# Patient Record
Sex: FEMALE | Race: WHITE | Employment: OTHER | URBAN - METROPOLITAN AREA
[De-identification: names, ages, dates, MRNs, and addresses within clinical notes are randomized per-mention and may not be internally consistent; named-entity substitution may affect disease eponyms.]

---

## 2021-03-24 ENCOUNTER — OFFICE VISIT (OUTPATIENT)
Dept: DERMATOLOGY | Facility: CLINIC | Age: 71
End: 2021-03-24
Payer: MEDICARE

## 2021-03-24 ENCOUNTER — TELEPHONE (OUTPATIENT)
Dept: DERMATOLOGY | Facility: CLINIC | Age: 71
End: 2021-03-24

## 2021-03-24 VITALS — DIASTOLIC BLOOD PRESSURE: 76 MMHG | HEART RATE: 71 BPM | OXYGEN SATURATION: 97 % | SYSTOLIC BLOOD PRESSURE: 135 MMHG

## 2021-03-24 DIAGNOSIS — D23.9 DERMAL NEVUS: ICD-10-CM

## 2021-03-24 DIAGNOSIS — L71.0 PERIORAL DERMATITIS: ICD-10-CM

## 2021-03-24 DIAGNOSIS — L82.0 INFLAMED SEBORRHEIC KERATOSIS: ICD-10-CM

## 2021-03-24 DIAGNOSIS — D18.01 ANGIOMA OF SKIN: ICD-10-CM

## 2021-03-24 DIAGNOSIS — L81.4 LENTIGO: ICD-10-CM

## 2021-03-24 DIAGNOSIS — L82.1 SEBORRHEIC KERATOSIS: ICD-10-CM

## 2021-03-24 DIAGNOSIS — L82.1 SEBORRHEIC KERATOSIS: Primary | ICD-10-CM

## 2021-03-24 PROCEDURE — 17110 DESTRUCTION B9 LES UP TO 14: CPT | Performed by: DERMATOLOGY

## 2021-03-24 PROCEDURE — 99203 OFFICE O/P NEW LOW 30 MIN: CPT | Mod: 25 | Performed by: DERMATOLOGY

## 2021-03-24 RX ORDER — ROSUVASTATIN CALCIUM 10 MG/1
TABLET, COATED ORAL
COMMUNITY
Start: 2020-08-03

## 2021-03-24 RX ORDER — DOXYCYCLINE 100 MG/1
100 CAPSULE ORAL 2 TIMES DAILY
Qty: 60 CAPSULE | Refills: 3 | Status: SHIPPED | OUTPATIENT
Start: 2021-03-24

## 2021-03-24 RX ORDER — MILK THISTLE 500 MG
1 CAPSULE ORAL
COMMUNITY

## 2021-03-24 RX ORDER — ALPRAZOLAM 0.5 MG
0.5 TABLET ORAL
COMMUNITY
Start: 2020-11-09

## 2021-03-24 RX ORDER — PIMECROLIMUS 10 MG/G
CREAM TOPICAL 2 TIMES DAILY
Qty: 60 G | Refills: 3 | Status: SHIPPED | OUTPATIENT
Start: 2021-03-24 | End: 2021-04-05

## 2021-03-24 RX ORDER — MOXIFLOXACIN 5 MG/ML
1 SOLUTION/ DROPS OPHTHALMIC
COMMUNITY

## 2021-03-24 RX ORDER — PROPRANOLOL HCL 60 MG
60 CAPSULE, EXTENDED RELEASE 24HR ORAL
COMMUNITY

## 2021-03-24 RX ORDER — ALENDRONATE SODIUM 70 MG/1
70 TABLET ORAL
COMMUNITY
Start: 2021-01-21

## 2021-03-24 RX ORDER — SUMATRIPTAN 50 MG/1
50 TABLET, FILM COATED ORAL
COMMUNITY

## 2021-03-24 RX ORDER — MULTIVITAMIN,THERAPEUTIC
1 TABLET ORAL
COMMUNITY

## 2021-03-24 RX ORDER — LISDEXAMFETAMINE DIMESYLATE 20 MG/1
20 CAPSULE ORAL
COMMUNITY
Start: 2019-09-01

## 2021-03-24 RX ORDER — ZOLPIDEM TARTRATE 5 MG/1
TABLET ORAL
COMMUNITY

## 2021-03-24 RX ORDER — LORAZEPAM 0.5 MG/1
0.5 TABLET ORAL
COMMUNITY

## 2021-03-24 RX ORDER — HYDROCHLOROTHIAZIDE 25 MG/1
25 TABLET ORAL
COMMUNITY
Start: 2020-12-09

## 2021-03-24 NOTE — LETTER
3/24/2021         RE: Tabatha Sharma  [address] [facility] [address]        Dear Colleague,    Thank you for referring your patient, Tabatha Sharma, to the [facility]. Please see a copy of my visit note below.    Tabatha Sharma , a 70 year old year old female patient, I was asked to see by Dr. Talley for spots on trunk and rash on face. Patient states this has been present for a while.  Patient reports the following symptoms:  Pimples on chin .  Patient reports the following previous treatments none.  Patient reports the following modifying factors none.  Associated symptoms: itching spots on trunk. .  Patient has no other skin complaints today.  Remainder of the HPI, Meds, PMH, Allergies, FH, and SH was reviewed in chart.    History reviewed. No pertinent past medical history.    History reviewed. No pertinent surgical history.     History reviewed. No pertinent family history.    Social History     Socioeconomic History     Marital status:      Spouse name: Not on file     Number of children: Not on file     Years of education: Not on file     Highest education level: Not on file   Occupational History     Not on file   Social Needs     Financial resource strain: Not on file     Food insecurity     Worry: Not on file     Inability: Not on file     Transportation needs     Medical: Not on file     Non-medical: Not on file   Tobacco Use     Smoking status: Never Smoker     Smokeless tobacco: Never Used   Substance and Sexual Activity     Alcohol use: Not on file     Drug use: Not on file     Sexual activity: Not on file   Lifestyle     Physical activity     Days per week: Not on file     Minutes per session: Not on file     Stress: Not on file   Relationships     Social connections     Talks on phone: Not on file     Gets together: Not on file     Attends Jainism service: Not on file     Active member of club or organization: Not on file     Attends meetings of  clubs or organizations: Not on file     Relationship status: Not on file     Intimate partner violence     Fear of current or ex partner: Not on file     Emotionally abused: Not on file     Physically abused: Not on file     Forced sexual activity: Not on file   Other Topics Concern     Not on file   Social History Narrative     Not on file       Outpatient Encounter Medications as of 3/24/2021   Medication Sig Dispense Refill     alendronate (FOSAMAX) 70 MG tablet Take 70 mg by mouth       ALPRAZolam (XANAX) 0.5 MG tablet Take 0.5 mg by mouth       aspirin (ASA) 81 MG EC tablet Take 81 mg by mouth       hydrochlorothiazide (HYDRODIURIL) 25 MG tablet Take 25 mg by mouth       lisdexamfetamine (VYVANSE) 20 MG capsule Take 20 mg by mouth       LORazepam (ATIVAN) 0.5 MG tablet Take 0.5 mg by mouth       Milk Thistle 500 MG CAPS Take 1 capsule by mouth       moxifloxacin (VIGAMOX) 0.5 % ophthalmic solution Apply 1 drop to eye       Multiple Vitamin (THERA-TABS) TABS Take 1 tablet by mouth       propranolol ER (INDERAL LA) 60 MG 24 hr capsule Take 60 mg by mouth       rosuvastatin (CRESTOR) 10 MG tablet Take one tablet every other day, o as directed       sertraline (ZOLOFT) 50 MG tablet Take 50 mg by mouth       SUMAtriptan (IMITREX) 50 MG tablet Take 50 mg by mouth       zolpidem (AMBIEN) 5 MG tablet        No facility-administered encounter medications on file as of 3/24/2021.              Review Of Systems  Skin: As above  Eyes: negative  Ears/Nose/Throat: negative  Respiratory: No shortness of breath, dyspnea on exertion, cough, or hemoptysis  Cardiovascular: negative  Gastrointestinal: negative  Genitourinary: negative  Musculoskeletal: negative  Neurologic: negative  Psychiatric: negative  Hematologic/Lymphatic/Immunologic: negative  Endocrine: negative      O:   NAD, WDWN, Alert & Oriented, Mood & Affect wnl, Vitals stable   Here today alone   /76   Pulse 71   SpO2 97%    General appearance alphonse  ii   Vitals stable   Alert, oriented and in no acute distress     R shin, L thigh, umbilicus, mons pubis inflamed seborrheic keratosis   Perioral inflammatory scaly papule    Stuck on papules and brown macules on trunk and ext    Red papules on trunk   Flesh colored papules on trunk      The remainder of the full exam was normal; the following areas were examined:  conjunctiva/lids, oral mucosa, neck, peripheral vascular system, abdomen, lymph nodes, digits/nails, eccrine and apocrine glands, scalp/hair, face, neck, chest, abdomen, buttocks, back, RUE, LUE, RLE, LLE       Eyes: Conjunctivae/lids:Normal     ENT: Lips, buccal mucosa, tongue: normal    MSK:Normal    Cardiovascular: peripheral edema none    Pulm: Breathing Normal    Lymph Nodes: No Head and Neck Lymphadenopathy     Neuro/Psych: Orientation:Normal; Mood/Affect:Normal      A/P:  1. Seborrheic keratosis, lentigo, angioma, dermal nevus  2. Inflamed seborrheic keratosis   R shin, L thigh, mons pubis, umbilicus  LN2:  Treated with LN2 for 5s for 1-2 cycles. Warned risks of blistering, pain, pigment change, scarring, and incomplete resolution.  Advised patient to return if lesions do not completely resolve.  Wound care sheet given.  3. Perioral derm   Pathophysiology discussed with pateint   Doxy twice daily  elidel twice daily  Return to clinic 6 weeks    It was a pleasure speaking to Tabatha Sharma today.  Previous clinic  notes and pertinent laboratory tests were reviewed prior to Tabatha Sharma's visit.  Nature of benign skin lesions dicussed with patient.  Signs and Symptoms of skin cancer discussed with patient.  Patient encouraged to perform monthly skin exams.  UV precautions reviewed with patient.  Return to clinic 6 weeks        Again, thank you for allowing me to participate in the care of your patient.        Sincerely,        Delta Cyr MD

## 2021-03-24 NOTE — TELEPHONE ENCOUNTER
Returned call. [facility] pharmacy requesting call by ordering clinic for diagnosis.     Advised by patient for staff to call - [phone]    Per pharmacy- PA must be initiated for Sofi. Will route to PA team to initiate.     Thank you,   Nico CORRIGAN RN   Specialty Clinics

## 2021-03-24 NOTE — PROGRESS NOTES
Tabatha Sharma , a 70 year old year old female patient, I was asked to see by Dr. Talley for spots on trunk and rash on face. Patient states this has been present for a while.  Patient reports the following symptoms:  Pimples on chin .  Patient reports the following previous treatments none.  Patient reports the following modifying factors none.  Associated symptoms: itching spots on trunk. .  Patient has no other skin complaints today.  Remainder of the HPI, Meds, PMH, Allergies, FH, and SH was reviewed in chart.    History reviewed. No pertinent past medical history.    History reviewed. No pertinent surgical history.     History reviewed. No pertinent family history.    Social History     Socioeconomic History     Marital status:      Spouse name: Not on file     Number of children: Not on file     Years of education: Not on file     Highest education level: Not on file   Occupational History     Not on file   Social Needs     Financial resource strain: Not on file     Food insecurity     Worry: Not on file     Inability: Not on file     Transportation needs     Medical: Not on file     Non-medical: Not on file   Tobacco Use     Smoking status: Never Smoker     Smokeless tobacco: Never Used   Substance and Sexual Activity     Alcohol use: Not on file     Drug use: Not on file     Sexual activity: Not on file   Lifestyle     Physical activity     Days per week: Not on file     Minutes per session: Not on file     Stress: Not on file   Relationships     Social connections     Talks on phone: Not on file     Gets together: Not on file     Attends Advent service: Not on file     Active member of club or organization: Not on file     Attends meetings of clubs or organizations: Not on file     Relationship status: Not on file     Intimate partner violence     Fear of current or ex partner: Not on file     Emotionally abused: Not on file     Physically abused: Not on file     Forced sexual activity: Not on  file   Other Topics Concern     Not on file   Social History Narrative     Not on file       Outpatient Encounter Medications as of 3/24/2021   Medication Sig Dispense Refill     alendronate (FOSAMAX) 70 MG tablet Take 70 mg by mouth       ALPRAZolam (XANAX) 0.5 MG tablet Take 0.5 mg by mouth       aspirin (ASA) 81 MG EC tablet Take 81 mg by mouth       hydrochlorothiazide (HYDRODIURIL) 25 MG tablet Take 25 mg by mouth       lisdexamfetamine (VYVANSE) 20 MG capsule Take 20 mg by mouth       LORazepam (ATIVAN) 0.5 MG tablet Take 0.5 mg by mouth       Milk Thistle 500 MG CAPS Take 1 capsule by mouth       moxifloxacin (VIGAMOX) 0.5 % ophthalmic solution Apply 1 drop to eye       Multiple Vitamin (THERA-TABS) TABS Take 1 tablet by mouth       propranolol ER (INDERAL LA) 60 MG 24 hr capsule Take 60 mg by mouth       rosuvastatin (CRESTOR) 10 MG tablet Take one tablet every other day, o as directed       sertraline (ZOLOFT) 50 MG tablet Take 50 mg by mouth       SUMAtriptan (IMITREX) 50 MG tablet Take 50 mg by mouth       zolpidem (AMBIEN) 5 MG tablet        No facility-administered encounter medications on file as of 3/24/2021.              Review Of Systems  Skin: As above  Eyes: negative  Ears/Nose/Throat: negative  Respiratory: No shortness of breath, dyspnea on exertion, cough, or hemoptysis  Cardiovascular: negative  Gastrointestinal: negative  Genitourinary: negative  Musculoskeletal: negative  Neurologic: negative  Psychiatric: negative  Hematologic/Lymphatic/Immunologic: negative  Endocrine: negative      O:   NAD, WDWN, Alert & Oriented, Mood & Affect wnl, Vitals stable   Here today alone   /76   Pulse 71   SpO2 97%    General appearance alphonse ii   Vitals stable   Alert, oriented and in no acute distress     R shin, L thigh, umbilicus, mons pubis inflamed seborrheic keratosis   Perioral inflammatory scaly papule    Stuck on papules and brown macules on trunk and ext    Red papules on trunk   Flesh  colored papules on trunk      The remainder of the full exam was normal; the following areas were examined:  conjunctiva/lids, oral mucosa, neck, peripheral vascular system, abdomen, lymph nodes, digits/nails, eccrine and apocrine glands, scalp/hair, face, neck, chest, abdomen, buttocks, back, RUE, LUE, RLE, LLE       Eyes: Conjunctivae/lids:Normal     ENT: Lips, buccal mucosa, tongue: normal    MSK:Normal    Cardiovascular: peripheral edema none    Pulm: Breathing Normal    Lymph Nodes: No Head and Neck Lymphadenopathy     Neuro/Psych: Orientation:Normal; Mood/Affect:Normal      A/P:  1. Seborrheic keratosis, lentigo, angioma, dermal nevus  2. Inflamed seborrheic keratosis   R shin, L thigh, mons pubis, umbilicus  LN2:  Treated with LN2 for 5s for 1-2 cycles. Warned risks of blistering, pain, pigment change, scarring, and incomplete resolution.  Advised patient to return if lesions do not completely resolve.  Wound care sheet given.  3. Perioral derm   Pathophysiology discussed with pateint   Doxy twice daily  elidel twice daily  Return to clinic 6 weeks    It was a pleasure speaking to Tabatha Sharma today.  Previous clinic  notes and pertinent laboratory tests were reviewed prior to Tabatha Sharma's visit.  Nature of benign skin lesions dicussed with patient.  Signs and Symptoms of skin cancer discussed with patient.  Patient encouraged to perform monthly skin exams.  UV precautions reviewed with patient.  Return to clinic 6 weeks

## 2021-03-24 NOTE — TELEPHONE ENCOUNTER
Please return call to patient.    Patient asking about her prescription's from Dr. Cyr from yesterday's office visit. Patient having trouble picking them up.    Alecia Rdz   [facility]  RN

## 2021-03-25 NOTE — TELEPHONE ENCOUNTER
PA Initiation    Medication: pimecrolimus (ELIDEL) 1 % cream -   Insurance Company: [employer] - Phone [phone] Fax [phone]  Pharmacy Filling the Rx: [id number] - [place], [place] - [address]  Filling Pharmacy Phone: [phone]  Filling Pharmacy Fax: [phone]  Start Date: 3/25/2021

## 2021-03-31 NOTE — TELEPHONE ENCOUNTER
"PRIOR AUTHORIZATION DENIED    Medication: pimecrolimus (ELIDEL) 1 % cream -     Denial Date: 3/26/2021    Denial Rational:         Appeal Information:     **Please advise if appeal is necessary and place a letter of medical necessity with clinical rationale under the \"letters\" tab in patient's chart and route back to [facility] [[id number]]        "

## 2021-04-05 RX ORDER — TACROLIMUS 1 MG/G
OINTMENT TOPICAL 2 TIMES DAILY
Qty: 60 G | Refills: 3 | Status: SHIPPED | OUTPATIENT
Start: 2021-04-05

## 2021-04-05 NOTE — TELEPHONE ENCOUNTER
Note below says denied since has not tried Tacrolimus.    Tacrolimus is Tier 4 per [employer] medicare formulary.     I do not see Protopic on formulary.     Change order cued-please sign if appropriate.    Lore Jo RN

## 2021-04-21 ENCOUNTER — OFFICE VISIT (OUTPATIENT)
Dept: DERMATOLOGY | Facility: CLINIC | Age: 71
End: 2021-04-21
Payer: MEDICARE

## 2021-04-21 ENCOUNTER — OFFICE VISIT (OUTPATIENT)
Dept: CARDIOLOGY | Facility: CLINIC | Age: 71
End: 2021-04-21
Payer: MEDICARE

## 2021-04-21 VITALS — DIASTOLIC BLOOD PRESSURE: 72 MMHG | SYSTOLIC BLOOD PRESSURE: 146 MMHG | HEART RATE: 61 BPM | RESPIRATION RATE: 16 BRPM

## 2021-04-21 VITALS — DIASTOLIC BLOOD PRESSURE: 77 MMHG | SYSTOLIC BLOOD PRESSURE: 139 MMHG | HEART RATE: 64 BPM | WEIGHT: 162 LBS

## 2021-04-21 DIAGNOSIS — L81.4 LENTIGO: ICD-10-CM

## 2021-04-21 DIAGNOSIS — D23.9 DERMAL NEVUS: ICD-10-CM

## 2021-04-21 DIAGNOSIS — L82.1 SEBORRHEIC KERATOSIS: Primary | ICD-10-CM

## 2021-04-21 DIAGNOSIS — D18.01 ANGIOMA OF SKIN: ICD-10-CM

## 2021-04-21 DIAGNOSIS — I10 ESSENTIAL HYPERTENSION: Primary | ICD-10-CM

## 2021-04-21 PROCEDURE — 99203 OFFICE O/P NEW LOW 30 MIN: CPT | Performed by: INTERNAL MEDICINE

## 2021-04-21 PROCEDURE — 99213 OFFICE O/P EST LOW 20 MIN: CPT | Performed by: DERMATOLOGY

## 2021-04-21 NOTE — LETTER
4/21/2021    Valeria Talley  [address] [place] [id number]    RE: Tabatha GERALDINE Sharma       Dear Colleague,    I had the pleasure of seeing Tabatha Sharma in the [facility].    HPI and Plan:   Today I had the pleasure of seeing Tabatha Sharma at [facility]. She is a pleasant 70 year old patient with a past medical history of hypertension, anxiety, moderate mitral regurgitation who presents to the clinic in an initial consultation.  The patient was previously seen at cardiology clinic in [facility] and presents to see us to seek a second opinion. She lives 80 miles away and her son who lives close to our [place] clinic suggested us for second opinion.    She tells me that she has noticed occasional 'rapid sensation' in the chest.  She thinks it is associated with taking Zoloft or hydrochlorothiazide.  Episodes last for an hour after taking these medications and subside spontaneously.  Does not report dizziness lightheadedness or passing out.  Also reports feeling extremely stressed and sad over the last 1 year.  Says has not been exercising or doing regular household work that she used to because of her mood.  This is also partly because she is worried about her health.  She has a treadmill but has not exercised on it over the last several months.  She has 2 flight of stairs in the house and notices mild shortness of breath on rapidly climbing them.  Denies chest pain, lower extremity edema, orthopnea, PND.    Echocardiogram at [facility] in 07/2020 showed moderate mitral regurgitation, moderate left atrial enlargement, low normal left ventricular ejection fraction of 50-55%, mild aortic regurgitation, normal RV size and systolic function and pulmonary pressure.  She was seen in cardiology clinic after that study the plan was to monitor with repeat echocardiogram in a year.    Assessment and plan:  1.  Moderate mitral regurgitation and mild  aortic regurgitation  2.  Low normal ejection fraction of 50-55%,  3.  Severe anxiety  4.  Hypertension  5.  Palpitations    I discussed with the patient that we can perform a rhythm monitor for episodes of palpitations.  However she says she lives 80 miles away and it would be difficult for her to schedule pickup and drop off.  Moreover, she does not believe her symptoms are 'quite worrisome'.  As far as mitral regurgitation is concerned it was read as moderate on the report from outside hospital.  I told her that we can repeat an echocardiogram in a year or sooner if needed.  Since she is asymptomatic from it I do not believe we need to do anything different at this time.  I briefly discussed the indications for valve repair for mitral regurgitation and told her that she does not meet criteria.  Plus, she is not keen on repair anyways.      As well as mild shortness of breath on climbing 2 flight of stairs is concerned I do not believe this is secondary to cardiac issues, especially MR.  It is possible that she is deconditioned because of not getting enough exercise over the last several months.  I told her to start exercising again as also suggested by her prior cardiologist.  I told her that if her symptoms worsen she can call and let us know and possibly come and stay with her son for week during which we can do all the necessary work-up.  She agrees.  I will have her come back and see us in a year with a repeat transthoracic echocardiogram.    Thank you for allowing me to participate in the care of Tabatha Sharma    This note was completed in part using Dragon voice recognition software. Although reviewed after completion, some word and grammatical errors may occur.    Quirino Camacho MD  Cardiology    Orders Placed This Encounter   Procedures     Follow-Up with Cardiologist       No orders of the defined types were placed in this encounter.      There are no discontinued medications.    Encounter Diagnosis    Name Primary?     Essential hypertension Yes       CURRENT MEDICATIONS:  Current Outpatient Medications   Medication Sig Dispense Refill     alendronate (FOSAMAX) 70 MG tablet Take 70 mg by mouth       ALPRAZolam (XANAX) 0.5 MG tablet Take 0.5 mg by mouth       aspirin (ASA) 81 MG EC tablet Take 81 mg by mouth       hydrochlorothiazide (HYDRODIURIL) 25 MG tablet Take 25 mg by mouth       lisdexamfetamine (VYVANSE) 20 MG capsule Take 20 mg by mouth Takes occ       Milk Thistle 500 MG CAPS Take 1 capsule by mouth       moxifloxacin (VIGAMOX) 0.5 % ophthalmic solution Apply 1 drop to eye       Multiple Vitamin (THERA-TABS) TABS Take 1 tablet by mouth       propranolol ER (INDERAL LA) 60 MG 24 hr capsule Take 60 mg by mouth       rosuvastatin (CRESTOR) 10 MG tablet Take one tablet every other day, o as directed       sertraline (ZOLOFT) 50 MG tablet Take 25 mg by mouth        tacrolimus (PROTOPIC) 0.1 % external ointment Apply topically 2 times daily Replacing Elidel cream which is not covered by insurance. 60 g 3     zolpidem (AMBIEN) 5 MG tablet        doxycycline monohydrate (MONODOX) 100 MG capsule Take 1 capsule (100 mg) by mouth 2 times daily 60 capsule 3     LORazepam (ATIVAN) 0.5 MG tablet Take 0.5 mg by mouth       SUMAtriptan (IMITREX) 50 MG tablet Take 50 mg by mouth         ALLERGIES     Allergies   Allergen Reactions     Cefuroxime Diarrhea     Hydrocodone-Acetaminophen Nausea     Other reaction(s): Stomach Upset, Unknown Reaction     Amoxicillin Unknown     Other reaction(s): Unknown Reaction  Other reaction(s): *Unknown - Follow up needed       Atorvastatin Muscle Pain (Myalgia)     Codeine Nausea and Vomiting and Nausea     Other reaction(s): Unknown Reaction     Telaprevir Rash     Severe rash  Severe rash         PAST MEDICAL HISTORY:  No past medical history on file.    PAST SURGICAL HISTORY:  No past surgical history on file.    FAMILY HISTORY:  No family history on file.    SOCIAL HISTORY:  Social  History     Socioeconomic History     Marital status:      Spouse name: None     Number of children: None     Years of education: None     Highest education level: None   Occupational History     None   Social Needs     Financial resource strain: None     Food insecurity     Worry: None     Inability: None     Transportation needs     Medical: None     Non-medical: None   Tobacco Use     Smoking status: Never Smoker     Smokeless tobacco: Never Used   Substance and Sexual Activity     Alcohol use: None     Drug use: None     Sexual activity: None   Lifestyle     Physical activity     Days per week: None     Minutes per session: None     Stress: None   Relationships     Social connections     Talks on phone: None     Gets together: None     Attends Sabianist service: None     Active member of club or organization: None     Attends meetings of clubs or organizations: None     Relationship status: None     Intimate partner violence     Fear of current or ex partner: None     Emotionally abused: None     Physically abused: None     Forced sexual activity: None   Other Topics Concern     None   Social History Narrative     None       Review of Systems:  Skin:  Negative       Eyes:  Negative      ENT:  Negative      Respiratory:  Negative       Cardiovascular:    palpitations;Positive for;fatigue;lightheadedness;dizziness    Gastroenterology: Negative      Genitourinary:  Negative      Musculoskeletal:  Negative      Neurologic:  Positive for headaches;memory problems    Psychiatric:  Positive for anxiety;depression    Heme/Lymph/Imm:  Negative      Endocrine:  Negative        Physical Exam:  Vitals: /77   Pulse 64   Wt 73.5 kg (162 lb)   Constitutional: awake, alert, no distress  Head: Normocephalic, atraumatic  Eyes: Conjunctivae and lids unremarkable, sclera white  ENT: No pallor or cyanosis  Respiratory: Good chest movement, no distress  Cardiac: Regular rate and rhythm, S1-S2 normal.  3/6  systolicmurmur. No pedal edema.   Extremities and musculoskeletal: No gross motor deficit  Neurological.  Affect normal  Psych: Alert and oriented x3    Recent Lab Results:  LIPID RESULTS:  No results found for: CHOL, HDL, LDL, TRIG, CHOLHDLRATIO    LIVER ENZYME RESULTS:  No results found for: AST, ALT    CBC RESULTS:  No results found for: WBC, RBC, HGB, HCT, MCV, MCH, MCHC, RDW, PLT    BMP RESULTS:  No results found for: NA, POTASSIUM, CHLORIDE, CO2, ANIONGAP, GLC, BUN, CR, GFRESTIMATED, GFRESTBLACK, MELISSA     A1C RESULTS:  No results found for: A1C    INR RESULTS:  No results found for: INR    CC  Referred Self, MD  No address on file    All medical records were reviewed in detail and discussed with the patient. Greater than 30 mins were spent with the patient, 50% of this time was spent on counseling and coordination of care.  After visit summary was printed and given to the patient.    Thank you for allowing me to participate in the care of your patient.      Sincerely,     Quirino Camacho MD     [facility]    cc:   Fabiola Owen MD  No address on file

## 2021-04-21 NOTE — PROGRESS NOTES
Tabatha Sharma is an extremely pleasant 70 year old year old female patient here today for brown and red spots on trunk.  Previous spots treated with cryo have cleared.   .   Patient states this has been present for a while.  Patient reports the following symptoms:  red.  Patient reports the following previous treatments none.  These treatments did not work.  Patient reports the following modifying factors none.  Associated symptoms: none.  Patient has no other skin complaints today.  Remainder of the HPI, Meds, PMH, Allergies, FH, and SH was reviewed in chart.    No past medical history on file.    No past surgical history on file.     No family history on file.    Social History     Socioeconomic History     Marital status:      Spouse name: Not on file     Number of children: Not on file     Years of education: Not on file     Highest education level: Not on file   Occupational History     Not on file   Social Needs     Financial resource strain: Not on file     Food insecurity     Worry: Not on file     Inability: Not on file     Transportation needs     Medical: Not on file     Non-medical: Not on file   Tobacco Use     Smoking status: Never Smoker     Smokeless tobacco: Never Used   Substance and Sexual Activity     Alcohol use: Not on file     Drug use: Not on file     Sexual activity: Not on file   Lifestyle     Physical activity     Days per week: Not on file     Minutes per session: Not on file     Stress: Not on file   Relationships     Social connections     Talks on phone: Not on file     Gets together: Not on file     Attends Hoahaoism service: Not on file     Active member of club or organization: Not on file     Attends meetings of clubs or organizations: Not on file     Relationship status: Not on file     Intimate partner violence     Fear of current or ex partner: Not on file     Emotionally abused: Not on file     Physically abused: Not on file     Forced sexual activity: Not on file    Other Topics Concern     Not on file   Social History Narrative     Not on file       Outpatient Encounter Medications as of 4/21/2021   Medication Sig Dispense Refill     alendronate (FOSAMAX) 70 MG tablet Take 70 mg by mouth       ALPRAZolam (XANAX) 0.5 MG tablet Take 0.5 mg by mouth       aspirin (ASA) 81 MG EC tablet Take 81 mg by mouth       doxycycline monohydrate (MONODOX) 100 MG capsule Take 1 capsule (100 mg) by mouth 2 times daily 60 capsule 3     hydrochlorothiazide (HYDRODIURIL) 25 MG tablet Take 25 mg by mouth       lisdexamfetamine (VYVANSE) 20 MG capsule Take 20 mg by mouth       LORazepam (ATIVAN) 0.5 MG tablet Take 0.5 mg by mouth       Milk Thistle 500 MG CAPS Take 1 capsule by mouth       moxifloxacin (VIGAMOX) 0.5 % ophthalmic solution Apply 1 drop to eye       Multiple Vitamin (THERA-TABS) TABS Take 1 tablet by mouth       propranolol ER (INDERAL LA) 60 MG 24 hr capsule Take 60 mg by mouth       rosuvastatin (CRESTOR) 10 MG tablet Take one tablet every other day, o as directed       sertraline (ZOLOFT) 50 MG tablet Take 50 mg by mouth       SUMAtriptan (IMITREX) 50 MG tablet Take 50 mg by mouth       tacrolimus (PROTOPIC) 0.1 % external ointment Apply topically 2 times daily Replacing Elidel cream which is not covered by insurance. 60 g 3     zolpidem (AMBIEN) 5 MG tablet        No facility-administered encounter medications on file as of 4/21/2021.              Review Of Systems  Skin: As above  Eyes: negative  Ears/Nose/Throat: negative  Respiratory: No shortness of breath, dyspnea on exertion, cough, or hemoptysis  Cardiovascular: negative  Gastrointestinal: negative  Genitourinary: negative  Musculoskeletal: negative  Neurologic: negative  Psychiatric: negative  Hematologic/Lymphatic/Immunologic: negative  Endocrine: negative      O:   NAD, WDWN, Alert & Oriented, Mood & Affect wnl, Vitals stable   Here today alone   BP (!) 146/72 (BP Location: Left arm, Patient Position: Sitting, Cuff  Size: Adult Large)   Pulse 61   Resp 16    General appearance normal   Vitals stable   Alert, oriented and in no acute distress        Stuck on papules and brown macules on trunk and ext   Red papules on trunk  Flesh colored papules on trunk  Mons pubis and r shin clear  L thigh inflamed seborrheic keratosis      The remainder of expanded problem focused exam was normal; the following areas were examined:  Ab, LLE, RLE, conjunctiva/lids, face, neck, , chest, digits/nails, RUE, LUE.      Eyes: Conjunctivae/lids:Normal     ENT: Lips, buccal mucosa, tongue: normal    MSK:Normal    Cardiovascular: peripheral edema none    Pulm: Breathing Normal    Lymph Nodes: No Head and Neck Lymphadenopathy     Neuro/Psych: Orientation:Alert and Orientedx3 ; Mood/Affect:normal       A/P:  1. Seborrheic keratosis, lentigo, angioma, dermal nevus  It was a pleasure speaking to Tabatha Sharma today.  Previous clinic  notes and pertinent laboratory tests were reviewed prior to Tabatha Sharma's visit.    Nature of benign skin lesions dicussed with patient.  Signs and Symptoms of skin cancer discussed with patient.  Patient encouraged to perform monthly skin exams.  UV precautions reviewed with patient.  Risks of non-melanoma skin cancer discussed with patient   Return to clinic 12 months

## 2021-04-21 NOTE — LETTER
4/21/2021         RE: Tabatha Sharma  [address] [facility] [address]        Dear Colleague,    Thank you for referring your patient, Tabatha Sharma, to the [facility]. Please see a copy of my visit note below.    Tabatha Sharma is an extremely pleasant 70 year old year old female patient here today for brown and red spots on trunk.  Previous spots treated with cryo have cleared.   .   Patient states this has been present for a while.  Patient reports the following symptoms:  red.  Patient reports the following previous treatments none.  These treatments did not work.  Patient reports the following modifying factors none.  Associated symptoms: none.  Patient has no other skin complaints today.  Remainder of the HPI, Meds, PMH, Allergies, FH, and SH was reviewed in chart.    No past medical history on file.    No past surgical history on file.     No family history on file.    Social History     Socioeconomic History     Marital status:      Spouse name: Not on file     Number of children: Not on file     Years of education: Not on file     Highest education level: Not on file   Occupational History     Not on file   Social Needs     Financial resource strain: Not on file     Food insecurity     Worry: Not on file     Inability: Not on file     Transportation needs     Medical: Not on file     Non-medical: Not on file   Tobacco Use     Smoking status: Never Smoker     Smokeless tobacco: Never Used   Substance and Sexual Activity     Alcohol use: Not on file     Drug use: Not on file     Sexual activity: Not on file   Lifestyle     Physical activity     Days per week: Not on file     Minutes per session: Not on file     Stress: Not on file   Relationships     Social connections     Talks on phone: Not on file     Gets together: Not on file     Attends Anglican service: Not on file     Active member of club or organization: Not on file     Attends meetings of clubs or  organizations: Not on file     Relationship status: Not on file     Intimate partner violence     Fear of current or ex partner: Not on file     Emotionally abused: Not on file     Physically abused: Not on file     Forced sexual activity: Not on file   Other Topics Concern     Not on file   Social History Narrative     Not on file       Outpatient Encounter Medications as of 4/21/2021   Medication Sig Dispense Refill     alendronate (FOSAMAX) 70 MG tablet Take 70 mg by mouth       ALPRAZolam (XANAX) 0.5 MG tablet Take 0.5 mg by mouth       aspirin (ASA) 81 MG EC tablet Take 81 mg by mouth       doxycycline monohydrate (MONODOX) 100 MG capsule Take 1 capsule (100 mg) by mouth 2 times daily 60 capsule 3     hydrochlorothiazide (HYDRODIURIL) 25 MG tablet Take 25 mg by mouth       lisdexamfetamine (VYVANSE) 20 MG capsule Take 20 mg by mouth       LORazepam (ATIVAN) 0.5 MG tablet Take 0.5 mg by mouth       Milk Thistle 500 MG CAPS Take 1 capsule by mouth       moxifloxacin (VIGAMOX) 0.5 % ophthalmic solution Apply 1 drop to eye       Multiple Vitamin (THERA-TABS) TABS Take 1 tablet by mouth       propranolol ER (INDERAL LA) 60 MG 24 hr capsule Take 60 mg by mouth       rosuvastatin (CRESTOR) 10 MG tablet Take one tablet every other day, o as directed       sertraline (ZOLOFT) 50 MG tablet Take 50 mg by mouth       SUMAtriptan (IMITREX) 50 MG tablet Take 50 mg by mouth       tacrolimus (PROTOPIC) 0.1 % external ointment Apply topically 2 times daily Replacing Elidel cream which is not covered by insurance. 60 g 3     zolpidem (AMBIEN) 5 MG tablet        No facility-administered encounter medications on file as of 4/21/2021.              Review Of Systems  Skin: As above  Eyes: negative  Ears/Nose/Throat: negative  Respiratory: No shortness of breath, dyspnea on exertion, cough, or hemoptysis  Cardiovascular: negative  Gastrointestinal: negative  Genitourinary: negative  Musculoskeletal: negative  Neurologic:  negative  Psychiatric: negative  Hematologic/Lymphatic/Immunologic: negative  Endocrine: negative      O:   NAD, WDWN, Alert & Oriented, Mood & Affect wnl, Vitals stable   Here today alone   BP (!) 146/72 (BP Location: Left arm, Patient Position: Sitting, Cuff Size: Adult Large)   Pulse 61   Resp 16    General appearance normal   Vitals stable   Alert, oriented and in no acute distress        Stuck on papules and brown macules on trunk and ext   Red papules on trunk  Flesh colored papules on trunk  Mons pubis and r shin clear  L thigh inflamed seborrheic keratosis      The remainder of expanded problem focused exam was normal; the following areas were examined:  Ab, LLE, RLE, conjunctiva/lids, face, neck, , chest, digits/nails, RUE, LUE.      Eyes: Conjunctivae/lids:Normal     ENT: Lips, buccal mucosa, tongue: normal    MSK:Normal    Cardiovascular: peripheral edema none    Pulm: Breathing Normal    Lymph Nodes: No Head and Neck Lymphadenopathy     Neuro/Psych: Orientation:Alert and Orientedx3 ; Mood/Affect:normal       A/P:  1. Seborrheic keratosis, lentigo, angioma, dermal nevus  It was a pleasure speaking to Tabatha Sharma today.  Previous clinic  notes and pertinent laboratory tests were reviewed prior to Tabatha Sharma's visit.    Nature of benign skin lesions dicussed with patient.  Signs and Symptoms of skin cancer discussed with patient.  Patient encouraged to perform monthly skin exams.  UV precautions reviewed with patient.  Risks of non-melanoma skin cancer discussed with patient   Return to clinic 12 months        Again, thank you for allowing me to participate in the care of your patient.        Sincerely,        Delta Cyr MD

## 2021-04-21 NOTE — NURSING NOTE
Initial BP (!) 146/72 (BP Location: Left arm, Patient Position: Sitting, Cuff Size: Adult Large)   Pulse 61   Resp 16  There is no height or weight on file to calculate BMI. .    Luz Marina Andrews, CMA

## 2021-04-21 NOTE — PROGRESS NOTES
HPI and Plan:   Today I had the pleasure of seeing Tabatha Sharma at [facility]. She is a pleasant 70 year old patient with a past medical history of hypertension, anxiety, moderate mitral regurgitation who presents to the clinic in an initial consultation.  The patient was previously seen at cardiology clinic in [facility] and presents to see us to seek a second opinion. She lives 80 miles away and her son who lives close to our [place] clinic suggested us for second opinion.    She tells me that she has noticed occasional 'rapid sensation' in the chest.  She thinks it is associated with taking Zoloft or hydrochlorothiazide.  Episodes last for an hour after taking these medications and subside spontaneously.  Does not report dizziness lightheadedness or passing out.  Also reports feeling extremely stressed and sad over the last 1 year.  Says has not been exercising or doing regular household work that she used to because of her mood.  This is also partly because she is worried about her health.  She has a treadmill but has not exercised on it over the last several months.  She has 2 flight of stairs in the house and notices mild shortness of breath on rapidly climbing them.  Denies chest pain, lower extremity edema, orthopnea, PND.    Echocardiogram at [facility] in 07/2020 showed moderate mitral regurgitation, moderate left atrial enlargement, low normal left ventricular ejection fraction of 50-55%, mild aortic regurgitation, normal RV size and systolic function and pulmonary pressure.  She was seen in cardiology clinic after that study the plan was to monitor with repeat echocardiogram in a year.    Assessment and plan:  1.  Moderate mitral regurgitation and mild aortic regurgitation  2.  Low normal ejection fraction of 50-55%,  3.  Severe anxiety  4.  Hypertension  5.  Palpitations    I discussed with the patient that we can perform a rhythm monitor for episodes of palpitations.  However she says  she lives 80 miles away and it would be difficult for her to schedule pickup and drop off.  Moreover, she does not believe her symptoms are 'quite worrisome'.  As far as mitral regurgitation is concerned it was read as moderate on the report from outside hospital.  I told her that we can repeat an echocardiogram in a year or sooner if needed.  Since she is asymptomatic from it I do not believe we need to do anything different at this time.  I briefly discussed the indications for valve repair for mitral regurgitation and told her that she does not meet criteria.  Plus, she is not keen on repair anyways.      As well as mild shortness of breath on climbing 2 flight of stairs is concerned I do not believe this is secondary to cardiac issues, especially MR.  It is possible that she is deconditioned because of not getting enough exercise over the last several months.  I told her to start exercising again as also suggested by her prior cardiologist.  I told her that if her symptoms worsen she can call and let us know and possibly come and stay with her son for week during which we can do all the necessary work-up.  She agrees.  I will have her come back and see us in a year with a repeat transthoracic echocardiogram.    Thank you for allowing me to participate in the care of Tabatha Sharma    This note was completed in part using Dragon voice recognition software. Although reviewed after completion, some word and grammatical errors may occur.    Quirino Camacho MD  Cardiology    Orders Placed This Encounter   Procedures     Follow-Up with Cardiologist       No orders of the defined types were placed in this encounter.      There are no discontinued medications.    Encounter Diagnosis   Name Primary?     Essential hypertension Yes       CURRENT MEDICATIONS:  Current Outpatient Medications   Medication Sig Dispense Refill     alendronate (FOSAMAX) 70 MG tablet Take 70 mg by mouth       ALPRAZolam (XANAX) 0.5 MG tablet Take  0.5 mg by mouth       aspirin (ASA) 81 MG EC tablet Take 81 mg by mouth       hydrochlorothiazide (HYDRODIURIL) 25 MG tablet Take 25 mg by mouth       lisdexamfetamine (VYVANSE) 20 MG capsule Take 20 mg by mouth Takes occ       Milk Thistle 500 MG CAPS Take 1 capsule by mouth       moxifloxacin (VIGAMOX) 0.5 % ophthalmic solution Apply 1 drop to eye       Multiple Vitamin (THERA-TABS) TABS Take 1 tablet by mouth       propranolol ER (INDERAL LA) 60 MG 24 hr capsule Take 60 mg by mouth       rosuvastatin (CRESTOR) 10 MG tablet Take one tablet every other day, o as directed       sertraline (ZOLOFT) 50 MG tablet Take 25 mg by mouth        tacrolimus (PROTOPIC) 0.1 % external ointment Apply topically 2 times daily Replacing Elidel cream which is not covered by insurance. 60 g 3     zolpidem (AMBIEN) 5 MG tablet        doxycycline monohydrate (MONODOX) 100 MG capsule Take 1 capsule (100 mg) by mouth 2 times daily 60 capsule 3     LORazepam (ATIVAN) 0.5 MG tablet Take 0.5 mg by mouth       SUMAtriptan (IMITREX) 50 MG tablet Take 50 mg by mouth         ALLERGIES     Allergies   Allergen Reactions     Cefuroxime Diarrhea     Hydrocodone-Acetaminophen Nausea     Other reaction(s): Stomach Upset, Unknown Reaction     Amoxicillin Unknown     Other reaction(s): Unknown Reaction  Other reaction(s): *Unknown - Follow up needed       Atorvastatin Muscle Pain (Myalgia)     Codeine Nausea and Vomiting and Nausea     Other reaction(s): Unknown Reaction     Telaprevir Rash     Severe rash  Severe rash         PAST MEDICAL HISTORY:  No past medical history on file.    PAST SURGICAL HISTORY:  No past surgical history on file.    FAMILY HISTORY:  No family history on file.    SOCIAL HISTORY:  Social History     Socioeconomic History     Marital status:      Spouse name: None     Number of children: None     Years of education: None     Highest education level: None   Occupational History     None   Social Needs     Financial  resource strain: None     Food insecurity     Worry: None     Inability: None     Transportation needs     Medical: None     Non-medical: None   Tobacco Use     Smoking status: Never Smoker     Smokeless tobacco: Never Used   Substance and Sexual Activity     Alcohol use: None     Drug use: None     Sexual activity: None   Lifestyle     Physical activity     Days per week: None     Minutes per session: None     Stress: None   Relationships     Social connections     Talks on phone: None     Gets together: None     Attends Faith service: None     Active member of club or organization: None     Attends meetings of clubs or organizations: None     Relationship status: None     Intimate partner violence     Fear of current or ex partner: None     Emotionally abused: None     Physically abused: None     Forced sexual activity: None   Other Topics Concern     None   Social History Narrative     None       Review of Systems:  Skin:  Negative       Eyes:  Negative      ENT:  Negative      Respiratory:  Negative       Cardiovascular:    palpitations;Positive for;fatigue;lightheadedness;dizziness    Gastroenterology: Negative      Genitourinary:  Negative      Musculoskeletal:  Negative      Neurologic:  Positive for headaches;memory problems    Psychiatric:  Positive for anxiety;depression    Heme/Lymph/Imm:  Negative      Endocrine:  Negative        Physical Exam:  Vitals: /77   Pulse 64   Wt 73.5 kg (162 lb)   Constitutional: awake, alert, no distress  Head: Normocephalic, atraumatic  Eyes: Conjunctivae and lids unremarkable, sclera white  ENT: No pallor or cyanosis  Respiratory: Good chest movement, no distress  Cardiac: Regular rate and rhythm, S1-S2 normal.  3/6 systolicmurmur. No pedal edema.   Extremities and musculoskeletal: No gross motor deficit  Neurological.  Affect normal  Psych: Alert and oriented x3    Recent Lab Results:  LIPID RESULTS:  No results found for: CHOL, HDL, LDL, TRIG,  CHOLHDLRATIO    LIVER ENZYME RESULTS:  No results found for: AST, ALT    CBC RESULTS:  No results found for: WBC, RBC, HGB, HCT, MCV, MCH, MCHC, RDW, PLT    BMP RESULTS:  No results found for: NA, POTASSIUM, CHLORIDE, CO2, ANIONGAP, GLC, BUN, CR, GFRESTIMATED, GFRESTBLACK, MELISSA     A1C RESULTS:  No results found for: A1C    INR RESULTS:  No results found for: INR    CC  Referred Self, MD  No address on file    All medical records were reviewed in detail and discussed with the patient. Greater than 30 mins were spent with the patient, 50% of this time was spent on counseling and coordination of care.  After visit summary was printed and given to the patient.